# Patient Record
Sex: FEMALE | Race: WHITE | NOT HISPANIC OR LATINO
[De-identification: names, ages, dates, MRNs, and addresses within clinical notes are randomized per-mention and may not be internally consistent; named-entity substitution may affect disease eponyms.]

---

## 2018-01-26 PROBLEM — Z00.00 ENCOUNTER FOR PREVENTIVE HEALTH EXAMINATION: Status: ACTIVE | Noted: 2018-01-26

## 2018-01-29 ENCOUNTER — APPOINTMENT (OUTPATIENT)
Dept: PODIATRY | Facility: CLINIC | Age: 58
End: 2018-01-29

## 2018-06-26 ENCOUNTER — TRANSCRIPTION ENCOUNTER (OUTPATIENT)
Age: 58
End: 2018-06-26

## 2018-10-22 ENCOUNTER — TRANSCRIPTION ENCOUNTER (OUTPATIENT)
Age: 58
End: 2018-10-22

## 2018-11-09 ENCOUNTER — OUTPATIENT (OUTPATIENT)
Dept: OUTPATIENT SERVICES | Facility: HOSPITAL | Age: 58
LOS: 1 days | Discharge: HOME | End: 2018-11-09

## 2018-11-09 DIAGNOSIS — Z12.31 ENCOUNTER FOR SCREENING MAMMOGRAM FOR MALIGNANT NEOPLASM OF BREAST: ICD-10-CM

## 2018-11-15 ENCOUNTER — OUTPATIENT (OUTPATIENT)
Dept: OUTPATIENT SERVICES | Facility: HOSPITAL | Age: 58
LOS: 1 days | Discharge: HOME | End: 2018-11-15

## 2018-11-15 DIAGNOSIS — R92.8 OTHER ABNORMAL AND INCONCLUSIVE FINDINGS ON DIAGNOSTIC IMAGING OF BREAST: ICD-10-CM

## 2018-11-29 ENCOUNTER — RESULT REVIEW (OUTPATIENT)
Age: 58
End: 2018-11-29

## 2018-11-29 ENCOUNTER — OUTPATIENT (OUTPATIENT)
Dept: OUTPATIENT SERVICES | Facility: HOSPITAL | Age: 58
LOS: 1 days | Discharge: HOME | End: 2018-11-29

## 2018-11-30 LAB — SURGICAL PATHOLOGY STUDY: SIGNIFICANT CHANGE UP

## 2018-12-04 DIAGNOSIS — N60.81 OTHER BENIGN MAMMARY DYSPLASIAS OF RIGHT BREAST: ICD-10-CM

## 2018-12-04 DIAGNOSIS — N63.10 UNSPECIFIED LUMP IN THE RIGHT BREAST, UNSPECIFIED QUADRANT: ICD-10-CM

## 2019-01-29 ENCOUNTER — APPOINTMENT (OUTPATIENT)
Dept: BREAST CENTER | Facility: CLINIC | Age: 59
End: 2019-01-29
Payer: MEDICAID

## 2019-01-29 VITALS
WEIGHT: 240 LBS | HEIGHT: 63 IN | SYSTOLIC BLOOD PRESSURE: 130 MMHG | DIASTOLIC BLOOD PRESSURE: 76 MMHG | TEMPERATURE: 97.6 F | BODY MASS INDEX: 42.52 KG/M2

## 2019-01-29 DIAGNOSIS — N60.11 DIFFUSE CYSTIC MASTOPATHY OF LEFT BREAST: ICD-10-CM

## 2019-01-29 DIAGNOSIS — M79.7 FIBROMYALGIA: ICD-10-CM

## 2019-01-29 DIAGNOSIS — Z80.1 FAMILY HISTORY OF MALIGNANT NEOPLASM OF TRACHEA, BRONCHUS AND LUNG: ICD-10-CM

## 2019-01-29 DIAGNOSIS — Z80.0 FAMILY HISTORY OF MALIGNANT NEOPLASM OF DIGESTIVE ORGANS: ICD-10-CM

## 2019-01-29 DIAGNOSIS — N60.12 DIFFUSE CYSTIC MASTOPATHY OF LEFT BREAST: ICD-10-CM

## 2019-01-29 DIAGNOSIS — Q87.1 CONGENITAL MALFORMATION SYNDROMES PREDOMINANTLY ASSOCIATED WITH SHORT STATURE: ICD-10-CM

## 2019-01-29 DIAGNOSIS — M06.9 RHEUMATOID ARTHRITIS, UNSPECIFIED: ICD-10-CM

## 2019-01-29 DIAGNOSIS — Z78.9 OTHER SPECIFIED HEALTH STATUS: ICD-10-CM

## 2019-01-29 PROCEDURE — 99202 OFFICE O/P NEW SF 15 MIN: CPT

## 2019-01-29 RX ORDER — HYDROXYCHLOROQUINE SULFATE 200 MG/1
200 TABLET ORAL
Refills: 0 | Status: ACTIVE | COMMUNITY

## 2019-01-29 RX ORDER — MELOXICAM 15 MG/1
TABLET ORAL
Refills: 0 | Status: ACTIVE | COMMUNITY

## 2019-02-12 NOTE — ASSESSMENT
[FreeTextEntry1] : Radha is a 58-year-old female who presents status post a right breast needle core biopsy. She recently underwent the ultrasound-guided needle core biopsy for a 1.3 cm mass in the 9:00 position of the right breast. Pathology demonstrated cystic/papillary apocrine metaplasia which is benign. There is no family history of breast or ovarian cancer. She has a benign clinical breast examination. There are no palpable findings, nipple discharge or inversion. I reviewed her recent imaging which appears benign. We will schedule the patient for a short-term right breast sonogram in June 2019. Followup after testing.\par \par I spent a total of 20 minutes of face to face time with this patient, greater than 50% of which was spent in counseling and/or coordination of care. All of her questions were appropriately answered.\par

## 2019-02-12 NOTE — DATA REVIEWED
[FreeTextEntry1] : EXAM:  MG MAMMO SCREEN W PETER BI#         PROCEDURE DATE:  11/09/2018      INTERPRETATION:  HISTORY: Patient is 58 years old and is seen for screening. The patient has no  personal history of cancer.     The patient has no family history of  breast cancer.  CLINICAL BREAST EXAM: The patient reports her last clinical breast exam was performed over one  year ago.  FILMS COMPARED: The present examination has been compared to prior imaging studies  performed at [facility] on 01/27/2012, 08/16/2013  and 05/09/2015.  MAMMOGRAM FINDINGS: The following mammographic views were obtained: bilateral craniocaudal  with tomosynthesis and bilateral mediolateral oblique with tomosynthesis.   Computer-aided detection was utilized in the interpretation of this  examination.  There are scattered areas of fibroglandular density.  There is a focal asymmetry seen in the upper outer quadrant of the right  breast.  In the left breast, no suspicious masses, calcifications or other  abnormalities are seen.  IMPRESSION: Focal asymmetry in the right breast requires additional evaluation.  RECOMMENDATION: Patient will be recalled for additional mammographic views and, if  indicated, breast ultrasound.  ASSESSMENT: BI-RADS Category 0:  Incomplete: Needs Additional Imaging Evaluation         ROBERTA SWANSON M.D., ATTENDING RADIOLOGIST This document has been electronically signed. Nov 9 2018  4:16PM         \par \par \par EXAM:  US BREAST LIMITED RT       EXAM:   MAMMO DIAG W PETER RT#        *** ADDENDUM 11/15/2018  ***  Dr. Hernandez was notified of these findings on November 15, 2018 at 1:40 PM  with read back.   *** END OF ADDENDUM 11/15/2018  ***    PROCEDURE DATE:  11/15/2018      INTERPRETATION:  Clinical History / Reason for exam: Additional imaging  requested from screening mammogram.  The patient returned for further evaluation of a focal asymmetry in the  upper outer quadrant of the right breast.   A right ML as well as spot compression views in the CC and MLO  projections of the right breast were performed from the patient's recent  screening mammogram dated November 9, 2018.   Computer-aided detection was utilized in the interpretation of this  examination.    Comparison is made to the prior examinations dating back to 2012.  Breast composition:There are scattered areas of fibroglandular density.  The focal asymmetry persists on spot compression imaging and measures 1.3  cm. See the sonogram report below.  Targeted Right Breast Sonogram:  At the 9-10 o'clock relation 13 cm from the nipple, there is a oval  circumscribed hypoechoic mass measuring 1.3 cm 0.8 cm x 0.3 cm and  correspond to the mammographic findings. An ultrasound guided core biopsy  is recommended.  Impression: Right breast mass seen mammographically and sonographically  at the 9 to 10:00 location 13 cm from the nipple as above.  Recommendation: Ultrasound guided biopsy.  BI-RADS Category 4: Suspicious  ***Please see the addendum at the top of this report. It may contain  additional important information or changes.****     JON ANAND M.D., ATTENDING RADIOLOGIST This document has been electronically signed. Nov 15 2018 12:57PM Addend:  JON ANAND M.D., ATTENDING RADIOLOGIST This addendum was electronically signed on: Nov 15 2018  1:42PM.      \par \par \par EXAM:  US BX BRST 1ST RT SISC        *** ADDENDUM 11/30/2018  ***  HISTOLOGY: FINAL DIAGNOSIS BREAST, RIGHT 9-10 O'CLOCK N13 MASS 1.3 CM, ULTRASOUND GUIDED NEEDLE CORE  BIOPSIES: - CYSTIC/PAPILLARY APOCRINE METAPLASIA.  This is benign concordant histology.  The patient was notified of the above benign concordant histologic  findings on November 30, 2018 at 5:30 PM with read back. The patient was  told to follow-up in 6 months for a diagnostic unilateral right mammogram  and targeted right breast sonogram as per guidelines.   *** END OF ADDENDUM 11/30/2018  ***    PROCEDURE DATE:  11/29/2018

## 2019-02-12 NOTE — REVIEW OF SYSTEMS
[Fever] : no fever [Chills] : no chills [Breast Pain] : no breast pain [Breast Lump] : no breast lump [Breast Swelling] : no breast swelling [Breast Reddening] : no reddening of the breast [Breast Warmth] : no breast warmth [Breast Itching] : no breast itching [Enlargement] : no breast enlargement [Decreasing In Size] : breast size not decreasing [Dimpling Of Skin] : no dimpling of breast skin ['Orange Peel' Appearance] : no 'orange peel' appearance of breast skin [Nipple Discharge] : no nipple discharge [Nipple Inverted] : no inversion of the nipple

## 2019-02-12 NOTE — REASON FOR VISIT
[Consultation] : a consultation visit [FreeTextEntry1] : Status post Right breast benign core biopsy; PMD is Dr. Chris Hernandez and GYN is Dr. Moisés Medina.

## 2019-02-12 NOTE — CONSULT LETTER
[Dear  ___] : Dear  [unfilled], [Consult Letter:] : I had the pleasure of evaluating your patient, [unfilled]. [Please see my note below.] : Please see my note below. [Consult Closing:] : Thank you very much for allowing me to participate in the care of this patient.  If you have any questions, please do not hesitate to contact me. [Sincerely,] : Sincerely, [FreeTextEntry2] : Chris Hernandez M.D.\par [address]\[place] [address]  [FreeTextEntry3] : Deidra Mota M.D., F.A.C.S.

## 2019-02-12 NOTE — HISTORY OF PRESENT ILLNESS
[FreeTextEntry1] : Patient with Right breast cystic/papillary apocrine metaplasia on ultrasound guided core biopsy 11/29/18; 9-10:00 N13, 13 mm (tophat).  \par No prior complaints related to the breasts.\par \par No family history of breast or ovarian cancer. Her family history is significant for her father with colon and lung cancer.  \par Her Dianne Model risk assessment is:\par 1.2 % in five years \par 7.4 % in her lifetime.\par

## 2019-02-12 NOTE — PAST MEDICAL HISTORY
[Postmenopausal] : The patient is postmenopausal [Menarche Age ____] : age at menarche was [unfilled] [Menopause Age____] : age at menopause was [unfilled] [Total Preg ___] : G[unfilled] [Live Births ___] : P[unfilled]  [Age At Live Birth ___] : Age at live birth: [unfilled] [History of Hormone Replacement Treatment] : has no history of hormone replacement treatment [FreeTextEntry5] : LIAN/BSO [FreeTextEntry6] : none [FreeTextEntry7] : OCP for more than 10 years, discontinued at age 38. [FreeTextEntry8] : none

## 2019-06-26 ENCOUNTER — FORM ENCOUNTER (OUTPATIENT)
Age: 59
End: 2019-06-26

## 2019-06-27 ENCOUNTER — OUTPATIENT (OUTPATIENT)
Dept: OUTPATIENT SERVICES | Facility: HOSPITAL | Age: 59
LOS: 1 days | Discharge: HOME | End: 2019-06-27
Payer: COMMERCIAL

## 2019-06-27 DIAGNOSIS — R92.8 OTHER ABNORMAL AND INCONCLUSIVE FINDINGS ON DIAGNOSTIC IMAGING OF BREAST: ICD-10-CM

## 2019-06-27 PROCEDURE — 76642 ULTRASOUND BREAST LIMITED: CPT | Mod: 26,RT

## 2019-07-25 ENCOUNTER — APPOINTMENT (OUTPATIENT)
Dept: BREAST CENTER | Facility: CLINIC | Age: 59
End: 2019-07-25

## 2019-08-02 ENCOUNTER — TRANSCRIPTION ENCOUNTER (OUTPATIENT)
Age: 59
End: 2019-08-02

## 2019-09-16 ENCOUNTER — TRANSCRIPTION ENCOUNTER (OUTPATIENT)
Age: 59
End: 2019-09-16

## 2019-11-14 ENCOUNTER — FORM ENCOUNTER (OUTPATIENT)
Age: 59
End: 2019-11-14

## 2019-11-15 ENCOUNTER — OUTPATIENT (OUTPATIENT)
Dept: OUTPATIENT SERVICES | Facility: HOSPITAL | Age: 59
LOS: 1 days | Discharge: HOME | End: 2019-11-15
Payer: COMMERCIAL

## 2019-11-15 DIAGNOSIS — Z12.31 ENCOUNTER FOR SCREENING MAMMOGRAM FOR MALIGNANT NEOPLASM OF BREAST: ICD-10-CM

## 2019-11-15 PROCEDURE — 77063 BREAST TOMOSYNTHESIS BI: CPT | Mod: 26

## 2019-11-15 PROCEDURE — 77067 SCR MAMMO BI INCL CAD: CPT | Mod: 26

## 2020-09-02 ENCOUNTER — TRANSCRIPTION ENCOUNTER (OUTPATIENT)
Age: 60
End: 2020-09-02

## 2020-12-11 ENCOUNTER — OUTPATIENT (OUTPATIENT)
Dept: OUTPATIENT SERVICES | Facility: HOSPITAL | Age: 60
LOS: 1 days | Discharge: HOME | End: 2020-12-11
Payer: COMMERCIAL

## 2020-12-11 DIAGNOSIS — Z12.31 ENCOUNTER FOR SCREENING MAMMOGRAM FOR MALIGNANT NEOPLASM OF BREAST: ICD-10-CM

## 2020-12-11 PROCEDURE — 77067 SCR MAMMO BI INCL CAD: CPT | Mod: 26

## 2020-12-11 PROCEDURE — 77063 BREAST TOMOSYNTHESIS BI: CPT | Mod: 26

## 2020-12-22 ENCOUNTER — OUTPATIENT (OUTPATIENT)
Dept: OUTPATIENT SERVICES | Facility: HOSPITAL | Age: 60
LOS: 1 days | Discharge: HOME | End: 2020-12-22
Payer: COMMERCIAL

## 2020-12-22 DIAGNOSIS — R92.8 OTHER ABNORMAL AND INCONCLUSIVE FINDINGS ON DIAGNOSTIC IMAGING OF BREAST: ICD-10-CM

## 2020-12-22 PROCEDURE — 77065 DX MAMMO INCL CAD UNI: CPT | Mod: 26,LT

## 2020-12-22 PROCEDURE — 77061 BREAST TOMOSYNTHESIS UNI: CPT | Mod: 26
